# Patient Record
Sex: MALE | Race: BLACK OR AFRICAN AMERICAN | NOT HISPANIC OR LATINO | Employment: OTHER | URBAN - METROPOLITAN AREA
[De-identification: names, ages, dates, MRNs, and addresses within clinical notes are randomized per-mention and may not be internally consistent; named-entity substitution may affect disease eponyms.]

---

## 2021-10-06 ENCOUNTER — TELEPHONE (OUTPATIENT)
Dept: PODIATRY | Facility: CLINIC | Age: 81
End: 2021-10-06

## 2021-10-07 ENCOUNTER — OFFICE VISIT (OUTPATIENT)
Dept: PODIATRY | Facility: CLINIC | Age: 81
End: 2021-10-07
Payer: MEDICARE

## 2021-10-07 ENCOUNTER — TELEPHONE (OUTPATIENT)
Dept: PODIATRY | Facility: CLINIC | Age: 81
End: 2021-10-07

## 2021-10-07 VITALS
BODY MASS INDEX: 22.4 KG/M2 | DIASTOLIC BLOOD PRESSURE: 83 MMHG | HEART RATE: 63 BPM | SYSTOLIC BLOOD PRESSURE: 185 MMHG | WEIGHT: 160 LBS | HEIGHT: 71 IN

## 2021-10-07 DIAGNOSIS — L85.3 DRY SKIN: ICD-10-CM

## 2021-10-07 DIAGNOSIS — L60.0 ONYCHOMYCOSIS WITH INGROWN TOENAIL: Primary | ICD-10-CM

## 2021-10-07 DIAGNOSIS — B35.1 ONYCHOMYCOSIS WITH INGROWN TOENAIL: Primary | ICD-10-CM

## 2021-10-07 PROCEDURE — 3079F DIAST BP 80-89 MM HG: CPT | Mod: CPTII,S$GLB,, | Performed by: PODIATRIST

## 2021-10-07 PROCEDURE — 1101F PR PT FALLS ASSESS DOC 0-1 FALLS W/OUT INJ PAST YR: ICD-10-PCS | Mod: CPTII,S$GLB,, | Performed by: PODIATRIST

## 2021-10-07 PROCEDURE — 3077F SYST BP >= 140 MM HG: CPT | Mod: CPTII,S$GLB,, | Performed by: PODIATRIST

## 2021-10-07 PROCEDURE — 1125F AMNT PAIN NOTED PAIN PRSNT: CPT | Mod: CPTII,S$GLB,, | Performed by: PODIATRIST

## 2021-10-07 PROCEDURE — 1160F RVW MEDS BY RX/DR IN RCRD: CPT | Mod: CPTII,S$GLB,, | Performed by: PODIATRIST

## 2021-10-07 PROCEDURE — 3288F FALL RISK ASSESSMENT DOCD: CPT | Mod: CPTII,S$GLB,, | Performed by: PODIATRIST

## 2021-10-07 PROCEDURE — 99203 PR OFFICE/OUTPT VISIT, NEW, LEVL III, 30-44 MIN: ICD-10-PCS | Mod: S$GLB,,, | Performed by: PODIATRIST

## 2021-10-07 PROCEDURE — 99203 OFFICE O/P NEW LOW 30 MIN: CPT | Mod: S$GLB,,, | Performed by: PODIATRIST

## 2021-10-07 PROCEDURE — 3077F PR MOST RECENT SYSTOLIC BLOOD PRESSURE >= 140 MM HG: ICD-10-PCS | Mod: CPTII,S$GLB,, | Performed by: PODIATRIST

## 2021-10-07 PROCEDURE — 99999 PR PBB SHADOW E&M-NEW PATIENT-LVL III: ICD-10-PCS | Mod: PBBFAC,,, | Performed by: PODIATRIST

## 2021-10-07 PROCEDURE — 1125F PR PAIN SEVERITY QUANTIFIED, PAIN PRESENT: ICD-10-PCS | Mod: CPTII,S$GLB,, | Performed by: PODIATRIST

## 2021-10-07 PROCEDURE — 3288F PR FALLS RISK ASSESSMENT DOCUMENTED: ICD-10-PCS | Mod: CPTII,S$GLB,, | Performed by: PODIATRIST

## 2021-10-07 PROCEDURE — 1101F PT FALLS ASSESS-DOCD LE1/YR: CPT | Mod: CPTII,S$GLB,, | Performed by: PODIATRIST

## 2021-10-07 PROCEDURE — 1159F PR MEDICATION LIST DOCUMENTED IN MEDICAL RECORD: ICD-10-PCS | Mod: CPTII,S$GLB,, | Performed by: PODIATRIST

## 2021-10-07 PROCEDURE — 1160F PR REVIEW ALL MEDS BY PRESCRIBER/CLIN PHARMACIST DOCUMENTED: ICD-10-PCS | Mod: CPTII,S$GLB,, | Performed by: PODIATRIST

## 2021-10-07 PROCEDURE — 3079F PR MOST RECENT DIASTOLIC BLOOD PRESSURE 80-89 MM HG: ICD-10-PCS | Mod: CPTII,S$GLB,, | Performed by: PODIATRIST

## 2021-10-07 PROCEDURE — 99999 PR PBB SHADOW E&M-NEW PATIENT-LVL III: CPT | Mod: PBBFAC,,, | Performed by: PODIATRIST

## 2021-10-07 PROCEDURE — 1159F MED LIST DOCD IN RCRD: CPT | Mod: CPTII,S$GLB,, | Performed by: PODIATRIST

## 2021-10-07 RX ORDER — BENAZEPRIL HYDROCHLORIDE 40 MG/1
40 TABLET ORAL
COMMUNITY
Start: 2021-07-06

## 2021-10-07 RX ORDER — HYDROCHLOROTHIAZIDE 25 MG/1
25 TABLET ORAL
COMMUNITY
Start: 2021-07-06

## 2021-10-07 RX ORDER — METOPROLOL SUCCINATE 50 MG/1
1 TABLET, EXTENDED RELEASE ORAL DAILY
COMMUNITY
Start: 2021-07-02

## 2021-10-07 RX ORDER — METFORMIN HYDROCHLORIDE 500 MG/1
500 TABLET ORAL
COMMUNITY
Start: 2021-07-06

## 2021-10-07 RX ORDER — UREA 200 MG/G
1 CREAM TOPICAL DAILY
Qty: 75 G | Refills: 10 | Status: SHIPPED | OUTPATIENT
Start: 2021-10-07

## 2021-10-07 RX ORDER — ASPIRIN 81 MG/1
81 TABLET ORAL
COMMUNITY

## 2024-06-24 ENCOUNTER — OFFICE VISIT (OUTPATIENT)
Dept: CARDIOLOGY | Facility: CLINIC | Age: 84
End: 2024-06-24
Payer: MEDICARE

## 2024-06-24 VITALS
DIASTOLIC BLOOD PRESSURE: 91 MMHG | HEART RATE: 79 BPM | BODY MASS INDEX: 28.02 KG/M2 | SYSTOLIC BLOOD PRESSURE: 192 MMHG | WEIGHT: 195.75 LBS | OXYGEN SATURATION: 99 % | HEIGHT: 70 IN

## 2024-06-24 DIAGNOSIS — I42.9 CARDIOMYOPATHY, UNSPECIFIED TYPE: ICD-10-CM

## 2024-06-24 DIAGNOSIS — R55 PRE-SYNCOPE: ICD-10-CM

## 2024-06-24 DIAGNOSIS — I73.9 PVD (PERIPHERAL VASCULAR DISEASE): ICD-10-CM

## 2024-06-24 DIAGNOSIS — I44.0 1ST DEGREE AV BLOCK: ICD-10-CM

## 2024-06-24 DIAGNOSIS — I10 ESSENTIAL HYPERTENSION: Primary | ICD-10-CM

## 2024-06-24 PROBLEM — E78.2 MIXED HYPERLIPIDEMIA: Status: ACTIVE | Noted: 2018-08-28

## 2024-06-24 PROBLEM — G89.29 CHRONIC RIGHT SHOULDER PAIN: Status: ACTIVE | Noted: 2022-10-15

## 2024-06-24 PROBLEM — N40.0 BPH (BENIGN PROSTATIC HYPERPLASIA): Status: ACTIVE | Noted: 2024-06-24

## 2024-06-24 PROBLEM — M50.90 CERVICAL NECK PAIN WITH EVIDENCE OF DISC DISEASE: Status: ACTIVE | Noted: 2022-10-15

## 2024-06-24 PROBLEM — E66.01 CLASS 2 SEVERE OBESITY WITH SERIOUS COMORBIDITY AND BODY MASS INDEX (BMI) OF 38.0 TO 38.9 IN ADULT: Status: RESOLVED | Noted: 2019-01-21 | Resolved: 2024-06-24

## 2024-06-24 PROBLEM — E55.9 VITAMIN D DEFICIENCY: Status: ACTIVE | Noted: 2022-02-12

## 2024-06-24 PROBLEM — E66.01 CLASS 2 SEVERE OBESITY WITH SERIOUS COMORBIDITY AND BODY MASS INDEX (BMI) OF 38.0 TO 38.9 IN ADULT: Status: ACTIVE | Noted: 2019-01-21

## 2024-06-24 PROBLEM — M25.512 CHRONIC LEFT SHOULDER PAIN: Status: ACTIVE | Noted: 2022-10-15

## 2024-06-24 PROBLEM — F03.90 DEMENTIA: Status: ACTIVE | Noted: 2024-06-24

## 2024-06-24 PROBLEM — M25.511 CHRONIC RIGHT SHOULDER PAIN: Status: ACTIVE | Noted: 2022-10-15

## 2024-06-24 PROBLEM — E11.65 TYPE 2 DIABETES MELLITUS WITH HYPERGLYCEMIA: Status: ACTIVE | Noted: 2018-08-28

## 2024-06-24 PROBLEM — I50.22 CHRONIC SYSTOLIC HEART FAILURE: Status: ACTIVE | Noted: 2024-06-24

## 2024-06-24 PROBLEM — G89.29 CHRONIC LEFT SHOULDER PAIN: Status: ACTIVE | Noted: 2022-10-15

## 2024-06-24 PROBLEM — Z95.828 PRESENCE OF IVC FILTER: Status: ACTIVE | Noted: 2022-10-15

## 2024-06-24 PROBLEM — R97.20 ELEVATED PSA: Status: ACTIVE | Noted: 2018-08-28

## 2024-06-24 LAB
OHS QRS DURATION: 124 MS
OHS QTC CALCULATION: 416 MS

## 2024-06-24 PROCEDURE — 93010 ELECTROCARDIOGRAM REPORT: CPT | Mod: S$GLB,,, | Performed by: INTERNAL MEDICINE

## 2024-06-24 PROCEDURE — 93005 ELECTROCARDIOGRAM TRACING: CPT | Mod: S$GLB,,, | Performed by: INTERNAL MEDICINE

## 2024-06-24 PROCEDURE — 99204 OFFICE O/P NEW MOD 45 MIN: CPT | Mod: S$GLB,,, | Performed by: INTERNAL MEDICINE

## 2024-06-24 PROCEDURE — 99999 PR PBB SHADOW E&M-EST. PATIENT-LVL III: CPT | Mod: PBBFAC,,, | Performed by: INTERNAL MEDICINE

## 2024-06-24 RX ORDER — CHLORHEXIDINE GLUCONATE ORAL RINSE 1.2 MG/ML
15 SOLUTION DENTAL 2 TIMES DAILY
COMMUNITY

## 2024-06-24 RX ORDER — TAMSULOSIN HYDROCHLORIDE 0.4 MG/1
CAPSULE ORAL DAILY
COMMUNITY

## 2024-06-24 RX ORDER — BISACODYL 10 MG/1
10 SUPPOSITORY RECTAL DAILY PRN
COMMUNITY

## 2024-06-24 RX ORDER — LISINOPRIL 20 MG/1
20 TABLET ORAL DAILY
COMMUNITY

## 2024-06-24 RX ORDER — FINASTERIDE 5 MG/1
5 TABLET, FILM COATED ORAL DAILY
COMMUNITY

## 2024-06-24 RX ORDER — AMLODIPINE BESYLATE 10 MG/1
10 TABLET ORAL DAILY
Qty: 90 TABLET | Refills: 3 | Status: SHIPPED | OUTPATIENT
Start: 2024-06-24 | End: 2025-06-24

## 2024-06-24 NOTE — PROGRESS NOTES
PCP - Feng Woods MD    Subjective:   Diego Drake is a 84 y.o. y.o. male from [facility] who moved from [place] to [place] about 12-13 months ago. He is here to establish care.     He has PMH for HTN, HLD, Cardiomyopathy (presumed NICMP as he says LHC was normal in [place] 3-4 years ago), dementia, BPH and cholecystectomy. Also has IVC filter for reason not known.     He reports getting frequent lightheadedness & pre-syncopal events but never passed out. He is in his wheelchair. His BP was 192/91 with HR 79 for nursing. I repeated and it is 201/90 with HR 75. No CP, leg swelling, orthopnea, dypsnea reported. He is currently only taking lisinopril 20 mg daily.     EKG in the office shows QRS > 120 ms likely non-specific IVCD, 1st degree AV block, wide spreak Q waves and 1 non conducted p-wave.     Echo [facility] 3/13/23   1. Estimated left ventricular ejection fraction is 30 to 35%.    2. Moderately decreased left ventricular systolic function.    3. Left atrium is severely dilated.    4. Mild aortic regurgitation.    5. Moderately dilated left ventricle.    6. Mildly dilated right atrium.    7. Dilated aortic root.    8. Moderate mitral valve regurgitation.    9. Right ventricular systolic function is normal.     Social History     Tobacco Use    Smoking status: Former    Smokeless tobacco: Never   Substance Use Topics    Alcohol use: Not on file     No family history on file.    Meds:     Review of patient's allergies indicates:   Allergen Reactions    Sulfamethoxazole-trimethoprim Anaphylaxis    Penicillin     Sulfa (sulfonamide antibiotics) Other (See Comments)    Sulfur        Current Outpatient Medications:     aspirin (ECOTRIN) 81 MG EC tablet, Take 81 mg by mouth., Disp: , Rfl:     bisacodyL (DULCOLAX) 10 mg Supp, Place 10 mg rectally daily as needed., Disp: , Rfl:     chlorhexidine (PERIDEX) 0.12 % solution, Use as directed 15 mLs in the mouth or throat 2 (two)  "times daily., Disp: , Rfl:     finasteride (PROSCAR) 5 mg tablet, Take 5 mg by mouth once daily., Disp: , Rfl:     lisinopriL (PRINIVIL,ZESTRIL) 20 MG tablet, Take 20 mg by mouth once daily., Disp: , Rfl:     tamsulosin (FLOMAX) 0.4 mg Cap, Take by mouth once daily., Disp: , Rfl:     urea 20 % Crea, Apply 1 application topically once daily. To toenails and dry skin on the feet., Disp: 75 g, Rfl: 10    hydroCHLOROthiazide (HYDRODIURIL) 25 MG tablet, Take 25 mg by mouth. (Patient not taking: Reported on 6/24/2024), Disp: , Rfl:     metFORMIN (GLUCOPHAGE) 500 MG tablet, Take 500 mg by mouth. (Patient not taking: Reported on 6/24/2024), Disp: , Rfl:     metoprolol succinate (TOPROL-XL) 50 MG 24 hr tablet, Take 1 tablet by mouth once daily. (Patient not taking: Reported on 6/24/2024), Disp: , Rfl:     Review of Systems   Constitutional:  Positive for malaise/fatigue.   Respiratory:  Positive for shortness of breath.    Cardiovascular:  Negative for chest pain, palpitations, orthopnea, claudication, leg swelling and PND.   Neurological:  Positive for dizziness (LH and pre syncope).       Objective:   BP (!) 192/91   Pulse 79   Ht 5' 10" (1.778 m)   Wt 88.8 kg (195 lb 12.3 oz)   SpO2 99%   BMI 28.09 kg/m²   Physical Exam  Constitutional:       Comments: In wheelchair   HENT:      Head: Normocephalic.      Mouth/Throat:      Mouth: Mucous membranes are moist.   Eyes:      Pupils: Pupils are equal, round, and reactive to light.   Cardiovascular:      Rate and Rhythm: Normal rate and regular rhythm.   Pulmonary:      Effort: Pulmonary effort is normal.   Abdominal:      General: Abdomen is flat.      Palpations: Abdomen is soft.   Neurological:      General: No focal deficit present.      Mental Status: He is alert.   Psychiatric:         Mood and Affect: Mood normal.       Labs:     Lab Results   Component Value Date     04/24/2023     (L) 12/16/2010    K 3.6 04/24/2023    K 3.9 12/16/2010     " "12/16/2010    CO2 25 04/24/2023    CO2 24 12/16/2010    BUN 18.0 04/24/2023    BUN 20 12/16/2010    CREATININE 0.79 04/24/2023    CREATININE 0.7 12/16/2010    GLUCOSE 100 (H) 04/24/2023    ANIONGAP 6 (L) 03/17/2023    ANIONGAP 14 12/16/2010     Lab Results   Component Value Date    HGBA1C 5.6 03/13/2023    HGBA1C 6.9 (H) 06/22/2010     No results found for: "BNP", "BNPTRIAGEBLO"    Lab Results   Component Value Date    WBC 4.9 10/19/2023    WBC 10.64 12/09/2010    HGB 9.5 (L) 10/19/2023    HGB 12.1 (L) 12/09/2010    HCT 29.2 (L) 10/19/2023    HCT 36.5 (L) 12/09/2010     12/09/2010    GRAN 8.2 (H) 12/09/2010    GRAN 77.3 (H) 12/09/2010     No results found for: "CHOL", "HDL", "LDLCALC", "TRIG"    Lab Results   Component Value Date     04/24/2023     (L) 12/16/2010    K 3.6 04/24/2023    K 3.9 12/16/2010     12/16/2010    CO2 25 04/24/2023    CO2 24 12/16/2010    BUN 18.0 04/24/2023    BUN 20 12/16/2010    CREATININE 0.79 04/24/2023    CREATININE 0.7 12/16/2010    GLUCOSE 100 (H) 04/24/2023    ANIONGAP 6 (L) 03/17/2023    ANIONGAP 14 12/16/2010     Lab Results   Component Value Date    HGBA1C 5.6 03/13/2023    HGBA1C 6.9 (H) 06/22/2010     No results found for: "BNP", "BNPTRIAGEBLO"    Vital Signs:   BP (!) 192/91   Pulse 79   Ht 5' 10" (1.778 m)   Wt 88.8 kg (195 lb 12.3 oz)   SpO2 99%   BMI 28.09 kg/m²   Lab Results   Component Value Date    WBC 4.9 10/19/2023    WBC 10.64 12/09/2010    HGB 9.5 (L) 10/19/2023    HGB 12.1 (L) 12/09/2010    HCT 29.2 (L) 10/19/2023    HCT 36.5 (L) 12/09/2010     12/09/2010    GRAN 8.2 (H) 12/09/2010    GRAN 77.3 (H) 12/09/2010     No results found for: "CHOL", "HDL", "LDLCALC", "TRIG"         Assessment & Plan:     1. Essential hypertension    2. PVD (peripheral vascular disease)    3. Mixed hyperlipidemia    4. Cardiomyopathy, unspecified type      #Lightheadedness  -He reports getting frequent lightheadedness & pre-syncopal events (no syncope " reported)  -EKG in the office shows QRS > 120 ms likely non-specific IVCD, 1st degree AV block, wide spreak Q waves and 1 non conducted p-wave  -needs to come in for overnight observation for telemetry, echo and cardiac work up    #HTN urgency  -uncontrolled as mentioned in HPI   -201/90  -on lisinopril 20 daily   -BP needs to be followed up on closely    #Cardiomyopathy  -presumed NICMP as he says LHC was normal in [place] 3-4 years ago  -no s/sx of heart failure at this time    #PVD  -no symptoms of claudication at this time  -continue ASA and statin    To go the ED at this time for bradyarhythmia and type 2b AV block concern. F/up post hospital discharge.     Addendum  My attending Dr Lo and I had a detailed discussion about him needing to go to the ED for our concerns regarding symptomatic bradyarhythmia and type 2b AV block. However, patient declined to visit the ED and wants to go back to nursing home. I also called his son Patti Drake and phone went to answering service and I left a VM. D/w patient all the involved risks including syncope, cardiac arrest and death. Patient voiced understanding    Prescribed norvasc 10 daily   Echo  30 day event monitor  F/up in 8 weeks    Signed:  Jackson Hodges M.D.  Cardiovascular Fellow PGY-V  [facility]

## 2024-07-02 ENCOUNTER — HOSPITAL ENCOUNTER (OUTPATIENT)
Dept: CARDIOLOGY | Facility: HOSPITAL | Age: 84
Discharge: HOME | End: 2024-07-02
Attending: INTERNAL MEDICINE
Payer: MEDICARE

## 2024-07-02 VITALS — WEIGHT: 195 LBS | BODY MASS INDEX: 27.92 KG/M2 | HEIGHT: 70 IN

## 2024-07-02 DIAGNOSIS — I42.9 CARDIOMYOPATHY, UNSPECIFIED TYPE: ICD-10-CM

## 2024-07-02 DIAGNOSIS — R55 PRE-SYNCOPE: ICD-10-CM

## 2024-07-02 LAB
ASCENDING AORTA: 3.45 CM
AV INDEX (PROSTH): 0.78
AV MEAN GRADIENT: 4 MMHG
AV PEAK GRADIENT: 7 MMHG
AV VALVE AREA BY VELOCITY RATIO: 2.74 CM²
AV VALVE AREA: 2.87 CM²
AV VELOCITY RATIO: 0.75
BSA FOR ECHO PROCEDURE: 2.09 M2
CV ECHO LV RWT: 0.25 CM
DOP CALC AO PEAK VEL: 1.31 M/S
DOP CALC AO VTI: 28.67 CM
DOP CALC LVOT AREA: 3.7 CM2
DOP CALC LVOT DIAMETER: 2.16 CM
DOP CALC LVOT PEAK VEL: 0.98 M/S
DOP CALC LVOT STROKE VOLUME: 82.41 CM3
DOP CALCLVOT PEAK VEL VTI: 22.5 CM
E WAVE DECELERATION TIME: 219.81 MSEC
E/A RATIO: 1.41
E/E' RATIO: 11.29 M/S
ECHO LV POSTERIOR WALL: 0.8 CM (ref 0.6–1.1)
FRACTIONAL SHORTENING: 20 % (ref 28–44)
GLOBAL LONGITUIDAL STRAIN: 8 %
INTERVENTRICULAR SEPTUM: 1.12 CM (ref 0.6–1.1)
LA MAJOR: 5.81 CM
LA MINOR: 5.82 CM
LA WIDTH: 4.29 CM
LEFT ATRIUM SIZE: 5.3 CM
LEFT ATRIUM VOLUME INDEX MOD: 38.4 ML/M2
LEFT ATRIUM VOLUME INDEX: 54.6 ML/M2
LEFT ATRIUM VOLUME MOD: 79.13 CM3
LEFT ATRIUM VOLUME: 112.38 CM3
LEFT INTERNAL DIMENSION IN SYSTOLE: 5.08 CM (ref 2.1–4)
LEFT VENTRICLE DIASTOLIC VOLUME INDEX: 100.39 ML/M2
LEFT VENTRICLE DIASTOLIC VOLUME: 206.8 ML
LEFT VENTRICLE MASS INDEX: 126 G/M2
LEFT VENTRICLE SYSTOLIC VOLUME INDEX: 59.5 ML/M2
LEFT VENTRICLE SYSTOLIC VOLUME: 122.55 ML
LEFT VENTRICULAR INTERNAL DIMENSION IN DIASTOLE: 6.38 CM (ref 3.5–6)
LEFT VENTRICULAR MASS: 260.26 G
LV LATERAL E/E' RATIO: 9.6 M/S
LV SEPTAL E/E' RATIO: 13.71 M/S
MV PEAK A VEL: 0.68 M/S
MV PEAK E VEL: 0.96 M/S
MV STENOSIS PRESSURE HALF TIME: 63.74 MS
MV VALVE AREA P 1/2 METHOD: 3.45 CM2
OHS LV EJECTION FRACTION SIMPSONS BIPLANE MOD: 37 %
PISA MRMAX VEL: 0.06 M/S
RA MAJOR: 5.13 CM
RA PRESSURE ESTIMATED: 3 MMHG
RA WIDTH: 5.34 CM
RIGHT VENTRICLE DIASTOLIC BASEL DIMENSION: 4 CM
SINUS: 3.52 CM
STJ: 3.03 CM
TDI LATERAL: 0.1 M/S
TDI SEPTAL: 0.07 M/S
TDI: 0.09 M/S
TRICUSPID ANNULAR PLANE SYSTOLIC EXCURSION: 2.02 CM
Z-SCORE OF LEFT VENTRICULAR DIMENSION IN END DIASTOLE: 0.25
Z-SCORE OF LEFT VENTRICULAR DIMENSION IN END SYSTOLE: 2.28

## 2024-07-02 PROCEDURE — 93306 TTE W/DOPPLER COMPLETE: CPT

## 2024-07-02 PROCEDURE — 93356 MYOCRD STRAIN IMG SPCKL TRCK: CPT | Mod: ,,, | Performed by: INTERNAL MEDICINE

## 2024-07-02 PROCEDURE — 93356 MYOCRD STRAIN IMG SPCKL TRCK: CPT

## 2024-07-02 PROCEDURE — 93306 TTE W/DOPPLER COMPLETE: CPT | Mod: 26,,, | Performed by: INTERNAL MEDICINE

## 2024-07-03 DIAGNOSIS — I73.9 PVD (PERIPHERAL VASCULAR DISEASE): ICD-10-CM

## 2024-07-03 DIAGNOSIS — I42.9 CARDIOMYOPATHY, UNSPECIFIED TYPE: Primary | ICD-10-CM

## 2024-07-03 DIAGNOSIS — I50.22 CHRONIC SYSTOLIC HEART FAILURE: ICD-10-CM

## 2024-07-03 DIAGNOSIS — I10 ESSENTIAL HYPERTENSION: Primary | ICD-10-CM

## 2024-07-03 DIAGNOSIS — E08.22 DIABETES MELLITUS DUE TO UNDERLYING CONDITION WITH DIABETIC CHRONIC KIDNEY DISEASE: ICD-10-CM

## 2024-07-03 RX ORDER — CARVEDILOL 3.12 MG/1
3.12 TABLET ORAL 2 TIMES DAILY WITH MEALS
Qty: 180 TABLET | Refills: 3 | Status: SHIPPED | OUTPATIENT
Start: 2024-07-03

## 2024-07-03 RX ORDER — SACUBITRIL AND VALSARTAN 24; 26 MG/1; MG/1
1 TABLET, FILM COATED ORAL 2 TIMES DAILY
Qty: 180 TABLET | Refills: 3 | Status: SHIPPED | OUTPATIENT
Start: 2024-07-03

## 2024-07-03 NOTE — TELEPHONE ENCOUNTER
Son updated on new meds ordered, EF, and what it means. He verbalized understanding and stated that all orders should be handled by the assistance living facility.  Per son:  Pt in assisted living: [facility] in [place]  Nurse # [phone]  fax [phone]  Spoke cresencio Cano nurse at facility, updated him on orders.  All orders and appt along w. med list faxed to facility along w. repeated instructions as per dr Hodges. Dr Hodges also specified to do labs before starting Entresto which I added up on the fax instructions.

## 2024-07-03 NOTE — TELEPHONE ENCOUNTER
----- Message from Tabatha Christian, RN sent at 7/3/2024  5:03 PM CDT -----  Regarding: orders  Message fr dr Hodges    I need your help with this nursing home patient with on and off dementia issue    EF is low on echo  need to start entresto 24/26 bid and coreg 3.125 bid  will stop norvasc  needs to see me in the office within this month  also ordering CBC, BMP, MG, BNP, TSH

## 2024-07-04 NOTE — PROGRESS NOTES
Reviewed echo with low EF  Starting entresto 24/26 bid and coreg 3.125 bid for now, need to add additional pillars once blood work is done and BP is ok  Labs ordered  I'm concerned about his rhythm and pre-syncopal issues (see last clinic note), he didn't go to the ED despite strong recommendation by me and Dr Lo  Will start HF GDMT and get him scheduled in the office for f/up

## 2024-07-08 ENCOUNTER — TELEPHONE (OUTPATIENT)
Dept: CARDIOLOGY | Facility: CLINIC | Age: 84
End: 2024-07-08
Payer: MEDICARE

## 2024-07-08 NOTE — TELEPHONE ENCOUNTER
----- Message from Xenia Galarza sent at 7/8/2024  1:41 PM CDT -----  Regarding: Medication ?   from [facility] in [place] called stating that the Np. Marilyn at the facility would like to know why the doctor stopped one of his medications which I couldn't understand after asking twice because she was talking to others while talking to me, and started the pt on Entresto 24-26 mg and Coreg 3.125 mg. The Np will not be there, but you can call and speak to the nurse Chapman at [phone] ext. [phone].    LOV 6/24/24 Dr. Chawla    Thanks

## 2024-07-08 NOTE — TELEPHONE ENCOUNTER
Spoke w. nurse and treating physician: told them amlodipine stopped due to starting Entresto to preserve BP. Latest visit note, note on starting Entresto and latest echo faxed to facility as requested. They are also faxing us the lab results.

## 2024-07-19 ENCOUNTER — TELEPHONE (OUTPATIENT)
Dept: CARDIOLOGY | Facility: CLINIC | Age: 84
End: 2024-07-19

## 2024-07-19 ENCOUNTER — OFFICE VISIT (OUTPATIENT)
Dept: CARDIOLOGY | Facility: CLINIC | Age: 84
End: 2024-07-19
Payer: MEDICARE

## 2024-07-19 ENCOUNTER — HOSPITAL ENCOUNTER (OUTPATIENT)
Dept: CARDIOLOGY | Facility: CLINIC | Age: 84
Discharge: HOME | End: 2024-07-19
Payer: MEDICARE

## 2024-07-19 ENCOUNTER — CLINICAL SUPPORT (OUTPATIENT)
Dept: CARDIOLOGY | Facility: HOSPITAL | Age: 84
End: 2024-07-19
Attending: INTERNAL MEDICINE
Payer: MEDICARE

## 2024-07-19 VITALS
HEART RATE: 64 BPM | DIASTOLIC BLOOD PRESSURE: 80 MMHG | OXYGEN SATURATION: 100 % | SYSTOLIC BLOOD PRESSURE: 180 MMHG | HEIGHT: 70 IN | BODY MASS INDEX: 27.75 KG/M2 | WEIGHT: 193.81 LBS

## 2024-07-19 DIAGNOSIS — I10 ESSENTIAL HYPERTENSION: ICD-10-CM

## 2024-07-19 DIAGNOSIS — I42.9 CARDIOMYOPATHY, UNSPECIFIED TYPE: ICD-10-CM

## 2024-07-19 DIAGNOSIS — I44.1 2ND DEGREE AV BLOCK: Primary | ICD-10-CM

## 2024-07-19 DIAGNOSIS — I50.22 CHRONIC SYSTOLIC HEART FAILURE: ICD-10-CM

## 2024-07-19 DIAGNOSIS — I50.22 CHRONIC SYSTOLIC HEART FAILURE: Primary | ICD-10-CM

## 2024-07-19 DIAGNOSIS — R55 PRE-SYNCOPE: ICD-10-CM

## 2024-07-19 DIAGNOSIS — R42 LIGHTHEADEDNESS: ICD-10-CM

## 2024-07-19 LAB
OHS QRS DURATION: 130 MS
OHS QTC CALCULATION: 416 MS

## 2024-07-19 PROCEDURE — 93010 ELECTROCARDIOGRAM REPORT: CPT | Mod: S$GLB,,, | Performed by: INTERNAL MEDICINE

## 2024-07-19 PROCEDURE — 93271 ECG/MONITORING AND ANALYSIS: CPT

## 2024-07-19 PROCEDURE — 99999 PR PBB SHADOW E&M-EST. PATIENT-LVL IV: CPT | Mod: PBBFAC,,,

## 2024-07-19 PROCEDURE — 93005 ELECTROCARDIOGRAM TRACING: CPT | Mod: S$GLB,,,

## 2024-07-19 PROCEDURE — 99214 OFFICE O/P EST MOD 30 MIN: CPT | Mod: 25,S$GLB,,

## 2024-07-19 RX ORDER — POLYETHYLENE GLYCOL 3350 17 G/17G
17 POWDER, FOR SOLUTION ORAL DAILY
COMMUNITY

## 2024-07-19 RX ORDER — ASPIRIN 81 MG/1
1 TABLET ORAL DAILY
COMMUNITY

## 2024-07-19 RX ORDER — AMOXICILLIN 250 MG
1 CAPSULE ORAL NIGHTLY
COMMUNITY

## 2024-07-19 RX ORDER — LATANOPROST 50 UG/ML
1 SOLUTION/ DROPS OPHTHALMIC NIGHTLY
COMMUNITY
Start: 2024-04-09

## 2024-07-19 NOTE — TELEPHONE ENCOUNTER
Spoke cresencio Hawley at [facility]. Updated her on upcoming labs fasting and visit on 8/29. Also clarified stopping carvedilol, stopping lisinopril and waiting 36 hrs to start Entresto bid. She verbalized understanding. appts notice and last visit notes faxed to facility. Ms Cruz ok'd changing bnp to probnp.

## 2024-07-19 NOTE — TELEPHONE ENCOUNTER
----- Message from Fatoumata Cruz PA-C sent at 7/19/2024 10:46 AM CDT -----  Regarding: Follow-up for nursing home patient  Mynor Mays,    Thank you again for your help in reiterating this patient's care plan to his nursing home.    Regarding his medications:  - He should START entresto 24-26mg twice daily. This was sent to the [facility] in [place] ([facility]) on 7/3/24.  - He should STOP lisinopril.  - He should STOP carvedilol (although, this appears to have never been started anyway).    Additionally, could you help with getting him scheduled for a 6-week follow up?  He is getting a 30-day monitor placed today. Then, in 6 weeks, he should be seen in fellow clinic (no BORIS) -- ideally, would see Dr. Jackson Hodges again for continuity of care.    He should have labs re-drawn just prior to his next visit. I've re-ordered all the labs Dr. Hodges wanted. Ideally would be drawn at an [facility] facility, but should at least be faxed to us before his next visit.    Please let me know if you have any questions!  Thank you so much,  Fatoumata

## 2024-07-19 NOTE — TELEPHONE ENCOUNTER
----- Message from Fatoumata Cruz PA-C sent at 7/19/2024 10:46 AM CDT -----  Regarding: Follow-up for nursing home patient  Mynor Mays,    Thank you again for your help in reiterating this patient's care plan to his nursing home.    Regarding his medications:  - He should START entresto 24-26mg twice daily. This was sent to the [facility] in [place] ([facility]) on 7/3/24.  - He should STOP lisinopril.  - He should STOP carvedilol (although, this appears to have never been started anyway).    Additionally, could you help with getting him scheduled for a 6-week follow up?  He is getting a 30-day monitor placed today. Then, in 6 weeks, he should be seen in fellow clinic (no [facility]) -- ideally, would see Dr. Jackson Hodges again for continuity of care.    He should have labs re-drawn just prior to his next visit. I've re-ordered all the labs Dr. Hodges wanted. Ideally would be drawn at an [facility] facility, but should at least be faxed to us before his next visit.    Please let me know if you have any questions!  Thank you so much,  Fatoumata

## 2024-07-19 NOTE — PROGRESS NOTES
General Cardiology Clinic Note  Last Clinic Visit: 6/24/24 with Dr. Hodges   General Cardiologist: Dr. Hodges     HPI:     Diego Drake is a 84 y.o. , who presents for HFrEF follow-up.    PROBLEM LIST:  Cardiomyopathy (presumed NICMP as he says LHC was normal in [place] 3-4 years ago)   HTN  HLD  2nd degree AVB  IVC filter for reason not known      Interval HPI:   He presents today for follow-up. TTE following last OV revealed EF of 35-40%. Dr. Hodges subsequently prescribed Entresto 24-26 mg and Coreg 3.125 mg on 7/3/24. However, the patient brought a medication list from his nursing home that was updated as of 7/12/24, and unfortunately neither of these medications were listed. The patient does not personally know the names of the medications that are given to him. Labs were ordered after this visit as well, and were to be faxed by nursing home, but we do not currently have these records on file.    Today, he reports feeling about the same - intermittent episodes of lightheadedness, no charles syncope, symptoms resolve with laying down. He does report occasional shortness of breath. Denies chest pain/pressure/tightness/discomfort, sustained palpitations, PND/orthopnea, or edema. He reports being active at his nursing home with the exercise program, which involves treadmill walking and other light exercises. A  from his nursing home brought him to clinic today. He is scheduled to have an event monitor set up following our visit today. BP today remains elevated. No evidence of concerning/complete heart block on EKG done this morning, as reviewed by myself and Dr. Lo.      6/2024 HPI (Dr. Hodges)  He has PMH for HTN, HLD, Cardiomyopathy (presumed NICMP as he says LHC was normal in [place] 3-4 years ago), dementia, BPH and cholecystectomy. Also has IVC filter for reason not known.   He reports getting frequent lightheadedness & pre-syncopal events but never passed out. He is in his  wheelchair. His BP was 192/91 with HR 79 for nursing. I repeated and it is 201/90 with HR 75. No CP, leg swelling, orthopnea, dypsnea reported. He is currently only taking lisinopril 20 mg daily.   EKG in the office shows QRS > 120 ms likely non-specific IVCD, 1st degree AV block, wide spreak Q waves and 1 non conducted p-wave.   My attending Dr Lo and [facility] had a detailed discussion about him needing to go to the ED for our concerns regarding symptomatic bradyarhythmia and type 2b AV block. However, patient declined to visit the ED and wants to go back to nursing home. I also called his son Patti Drake and phone went to answering service and I left a VM. D/w patient all the involved risks including syncope, cardiac arrest and death. Patient voiced understanding.    Attestation from Dr. Lo:  I spoke extensively with Mr. Drake about the heart's conduction system, his recent lightheadedness, and the possible relationship between his apparent conduction system disease and the risk of syncope.  I recommended inpatient observation and he initially agreed after much hesitancy, but he then changed his mind.  Dr. Hodges and I were both unequivocal in our recommendation that he be admitted for observation and, pending telemetry findings and EP consultation, consideration for a pacemaker implantation.  Though Mr. Drake has a history of dementia, his mental status appeared well to me today.  His thoughts were linear, he responded to me appropriately, and expressed understanding of what were explaining.  We asked if we or he could contact next of kin for further discussion but he said that was not possible.  He was warned to go the ED with any near-syncope or syncope, advised to avoid dangerous situations, and also advised not to operate a motor vehicle or heavy equipment.      Surgical: Reviewed, as below.  Family: Reviewed, as below.   Social: Reviewed, as below.    ROS:    Pertinent ROS included in HPI and  below.  PMH:   No past medical history on file.  No past surgical history on file.  Allergies:     Review of patient's allergies indicates:   Allergen Reactions    Sulfamethoxazole-trimethoprim Anaphylaxis    Penicillin     Sulfa (sulfonamide antibiotics) Other (See Comments)    Sulfur      Medications:     Current Outpatient Medications on File Prior to Visit   Medication Sig Dispense Refill    aspirin (ECOTRIN) 81 MG EC tablet Take 1 tablet by mouth once daily.      bisacodyL (DULCOLAX) 10 mg Supp Place 10 mg rectally daily as needed.      finasteride (PROSCAR) 5 mg tablet Take 5 mg by mouth once daily.      latanoprost 0.005 % ophthalmic solution Place 1 drop into both eyes nightly.      polyethylene glycol (GLYCOLAX) 17 gram PwPk Take 17 g by mouth once daily.      senna-docusate 8.6-50 mg (SENNA WITH DOCUSATE SODIUM) 8.6-50 mg per tablet Take 1 tablet by mouth nightly.      tamsulosin (FLOMAX) 0.4 mg Cap Take by mouth once daily.      [DISCONTINUED] aspirin (ECOTRIN) 81 MG EC tablet Take 81 mg by mouth.      [DISCONTINUED] lisinopriL (PRINIVIL,ZESTRIL) 20 MG tablet Take 20 mg by mouth once daily.      chlorhexidine (PERIDEX) 0.12 % solution Use as directed 15 mLs in the mouth or throat 2 (two) times daily. (Patient not taking: Reported on 7/19/2024)      metFORMIN (GLUCOPHAGE) 500 MG tablet Take 500 mg by mouth. (Patient not taking: Reported on 6/24/2024)      sacubitriL-valsartan (ENTRESTO) 24-26 mg per tablet Take 1 tablet by mouth 2 (two) times daily. (Patient not taking: Reported on 7/19/2024) 180 tablet 3    urea 20 % Crea Apply 1 application topically once daily. To toenails and dry skin on the feet. (Patient not taking: Reported on 7/19/2024) 75 g 10    [DISCONTINUED] carvediloL (COREG) 3.125 MG tablet Take 1 tablet (3.125 mg total) by mouth 2 (two) times daily with meals. (Patient not taking: Reported on 7/19/2024) 180 tablet 3     No current facility-administered medications on file prior to visit.  "    Social History:     Social History     Tobacco Use    Smoking status: Former    Smokeless tobacco: Never   Substance Use Topics    Alcohol use: Not on file     Family History:   No family history on file.  Physical Exam:   BP (!) 180/80   Pulse 64   Ht 5' 10" (1.778 m)   Wt 87.9 kg (193 lb 12.6 oz)   SpO2 100%   BMI 27.81 kg/m²      Physical Exam  Constitutional:       Appearance: Normal appearance.      Comments: Sitting in wheelchair   HENT:      Head: Normocephalic.      Mouth/Throat:      Mouth: Mucous membranes are moist.   Neck:      Vascular: No carotid bruit.   Cardiovascular:      Rate and Rhythm: Normal rate and regular rhythm.      Pulses:           Carotid pulses are 2+ on the right side and 2+ on the left side.       Radial pulses are 2+ on the right side and 2+ on the left side.      Heart sounds: Normal heart sounds. No murmur heard.  Pulmonary:      Effort: Pulmonary effort is normal.      Breath sounds: Normal breath sounds.   Musculoskeletal:      Right lower leg: No edema.      Left lower leg: No edema.   Skin:     General: Skin is warm and dry.   Neurological:      Mental Status: He is alert and oriented to person, place, and time.          Labs:     Blood Tests:  Lab Results   Component Value Date     04/24/2023     (L) 12/16/2010    K 3.6 04/24/2023    K 3.9 12/16/2010     12/16/2010    CO2 25 04/24/2023    CO2 24 12/16/2010    BUN 18.0 04/24/2023    BUN 20 12/16/2010    CREATININE 0.79 04/24/2023    CREATININE 0.7 12/16/2010     (H) 12/16/2010    HGBA1C 5.6 03/13/2023    HGBA1C 6.9 (H) 06/22/2010    MG 2.2 12/16/2010    AST 33 03/26/2023    AST 28 12/07/2010    ALT 57 (H) 03/26/2023    ALT 17 12/07/2010    ALBUMIN 2.9 (L) 03/26/2023    ALBUMIN 3.1 (L) 12/07/2010    PROT 6.9 12/07/2010    BILITOT 0.8 03/26/2023    BILITOT 0.8 12/07/2010    WBC 4.9 10/19/2023    WBC 10.64 12/09/2010    HGB 9.5 (L) 10/19/2023    HGB 12.1 (L) 12/09/2010    HCT 29.2 (L) 10/19/2023 " "   HCT 36.5 (L) 2010    MCV 80.2 10/19/2023    MCV 80.9 (L) 2010     2010    INR 1.2 2023    INR 1.0 2010    TSH 0.814 2010       No results found for: "CHOL", "HDL", "LDL", "TRIG"    No results found for: "LDLCALC"    Lab Results   Component Value Date    TSH 0.814 2010       Lab Results   Component Value Date    HGBA1C 5.6 2023         Imaging:     Echocardiogram  TTE 2024    Left Ventricle: The left ventricle is mildly dilated. Mildly increased ventricular mass. Normal wall thickness. There is eccentric hypertrophy. Regional wall motion abnormalities present. See diagram for wall motion findings. There is moderately reduced systolic function with a visually estimated ejection fraction of 35 - 40%. Biplane (2D) method of discs ejection fraction is 37%. Global longitudinal strain is -8.0%. Grade III diastolic dysfunction.    Right Ventricle: Normal right ventricular cavity size. Wall thickness is normal. Systolic function is normal.    Left Atrium: Left atrium is severely dilated.    Right Atrium: Right atrium is moderately dilated.    Aortic Valve: The aortic valve is a trileaflet valve. There is mild aortic valve sclerosis. There is mild aortic regurgitation.    Mitral Valve: There is mild bileaflet sclerosis. There is mild mitral annular calcification present.    IVC/SVC: Normal venous pressure at 3 mmHg.    Stress testing  None    Cath Lab  None    Other  None    EK24 - SR with 1st degree AVB, no evidence of CHB (reviewed by myself and Dr. Lo)    24 - Sinus rhythm probably with 1st degree AV block and a blocked PAC, 76 bpm  Cannot exclude 2nd degree AV block - Mobitz II   Nonspecific intraventricular block   Possible Inferior infarct ,age undetermined   Anterolateral infarct ,age undetermined     Assessment:     1. 2nd degree AV block    2. Lightheadedness    3. Cardiomyopathy, unspecified type    4. Chronic systolic heart failure    5. " Essential hypertension        Plan:     2nd degree AV block  Lightheadedness  EKG today reviewed by myself and Dr. Lo - no evidence of concerning/complete heart block. He will have an event monitor set up today, as ordered by Dr. Hodges.  He has been advised to go the ED with any near-syncope or syncope.     Essential hypertension  Cardiomyopathy, unspecified type  Chronic systolic heart failure  Recent TTE revealed EF of 35-40%. Amlodipine was subsequently stopped, and pt was prescribed Entresto 24-26 mg and Coreg 3.125 mg on 7/3/24.  After review of his medication list that was provided by his nursing home, which appears to be updated as of 7/12/24, he had not started carvedilol or entresto, and is still being given lisinopril.    I discussed this patient's case with Dr. Lo, who had previously seen/examined the patient along with Dr. Hodges. Given AV block currently under investigation, will opt not to start coreg for now (it appears to have never been given).   I provided the patient copies of his corrected medication list, which was placed in his appointment binder to be given back to his nursing home, and confirmed with his  that this will be given to his care team there. The changes of concern were as follows: stop lisinopril, start entresto, and stop/do not give carvedilol.    He should return in 6 weeks (following event monitor), to be seen in fellow/staff MD clinic (ideally, Dr. Hodges) with current ordered labs (CBC, CMP, Mag, BNP, TSH, lipids) available for review in our system. As an extra precaution, I reordered these labs today.  Our team will personally reach out to this patient's nursing home to ensure all of this is reiterated to his nursing home care team.    Signed:  Lauren Vlosich, PA-C [facility]     7/19/2024     Follow-up:     Future Appointments   Date Time Provider Department Center   7/19/2024  2:00 PM MONITOR, ARRHYTHMIA EVENT [facility] [place]